# Patient Record
Sex: MALE | Race: WHITE | Employment: OTHER | URBAN - METROPOLITAN AREA
[De-identification: names, ages, dates, MRNs, and addresses within clinical notes are randomized per-mention and may not be internally consistent; named-entity substitution may affect disease eponyms.]

---

## 2018-11-07 ENCOUNTER — RADIANT APPOINTMENT (OUTPATIENT)
Dept: GENERAL RADIOLOGY | Facility: CLINIC | Age: 73
End: 2018-11-07
Attending: FAMILY MEDICINE
Payer: COMMERCIAL

## 2018-11-07 ENCOUNTER — OFFICE VISIT (OUTPATIENT)
Dept: ORTHOPEDICS | Facility: CLINIC | Age: 73
End: 2018-11-07
Payer: COMMERCIAL

## 2018-11-07 VITALS
HEIGHT: 73 IN | BODY MASS INDEX: 30.22 KG/M2 | SYSTOLIC BLOOD PRESSURE: 127 MMHG | WEIGHT: 228 LBS | DIASTOLIC BLOOD PRESSURE: 71 MMHG

## 2018-11-07 DIAGNOSIS — S59.911A INJURY OF RIGHT LOWER ARM, INITIAL ENCOUNTER: ICD-10-CM

## 2018-11-07 DIAGNOSIS — M25.521 RIGHT ELBOW PAIN: ICD-10-CM

## 2018-11-07 DIAGNOSIS — M25.531 RIGHT WRIST PAIN: ICD-10-CM

## 2018-11-07 DIAGNOSIS — M25.531 RIGHT WRIST PAIN: Primary | ICD-10-CM

## 2018-11-07 PROCEDURE — 73090 X-RAY EXAM OF FOREARM: CPT | Mod: RT

## 2018-11-07 PROCEDURE — 29125 APPL SHORT ARM SPLINT STATIC: CPT | Mod: RT | Performed by: FAMILY MEDICINE

## 2018-11-07 PROCEDURE — 99203 OFFICE O/P NEW LOW 30 MIN: CPT | Mod: 25 | Performed by: FAMILY MEDICINE

## 2018-11-07 RX ORDER — CHLORTHALIDONE 25 MG/1
25 TABLET ORAL
COMMUNITY
Start: 2018-10-02 | End: 2019-10-02

## 2018-11-07 RX ORDER — LOSARTAN POTASSIUM 50 MG/1
50 TABLET ORAL DAILY
Refills: 3 | COMMUNITY
Start: 2018-09-02

## 2018-11-07 RX ORDER — WARFARIN SODIUM 2 MG/1
TABLET ORAL
COMMUNITY
Start: 2018-10-30

## 2018-11-07 RX ORDER — TRAMADOL HYDROCHLORIDE 50 MG/1
TABLET ORAL
COMMUNITY

## 2018-11-07 NOTE — LETTER
"    2018         RE: Yoel Hollis  [address] [facility] [address]        Dear Colleague,    Thank you for referring your patient, Yoel Hollis, to the [facility] [place]. Please see a copy of my visit note below.    Yoel Hollis  :  1945  DOS: 2018  MRN: [id number]    Sports Medicine Clinic Visit    PCP: Mookie Sauceda    Yoel Hollis is a 73 year old Right hand dominant male who is seen as an AIC patient presenting with a right forearm injury.  While putting away a canoe when he flipped it up, he felt a snap in his forearm.  Pain from his elbow to his wrist.      Injury: 2 week(s).  Pain located over right forearm, nonradiating.  Additional Features:  Positive: swelling and bruising.  Symptoms are better with Ice.  Symptoms are worse with: any movements.  Other evaluation and/or treatments so far consists of: Nothing.  Recent imaging completed: No recent imaging completed.  Prior History of related problems: denies     Social History: retired     Review of Systems  Musculoskeletal: as above  Remainder of review of systems is negative including constitutional, CV, pulmonary, GI, Skin and Neurologic except as noted in HPI or medical history.    No past medical history on file.  No past surgical history on file.    Objective  /71  Ht 6' 1\" (1.854 m)  Wt 228 lb (103.4 kg)  BMI 30.08 kg/m2    General: healthy, alert and in no distress    HEENT: no scleral icterus or conjunctival erythema   Skin: no suspicious lesions or rash. No jaundice.   CV: regular rhythm by palpation, 2+ distal pulses, no pedal edema    Resp: normal respiratory effort without conversational dyspnea   Psych: normal mood and affect    Gait: non antalgic, appropriate coordination and balance   Neuro: normal light touch sensory exam of the extremities. Motor strength as noted below     Right Wrist and Hand exam    Inspection:       Swelling: generally about dorsal forearm, extensor mass, " dorsal wrist compartments    Tender:       Wrist and finger extensors, radial/dorsal forearm, lateral/distal forearm, very focal over radial head/neck, mild distal radius and radiocarpal joint    Non Tender:       Remainder of the Wrist and Hand right,      anatomic snuffbox right and      TFCC right    ROM:       Decreased active and passive ROM of the wrist and forearm  with flexion, extension and sup[ination and active finger extension    Strength:       Motor function intact    Neurovascular:       2+ radial pulses bilaterally with brisk capillary refill and      normal sensation to light touch in the radial, median and ulnar nerve distributions    Right Elbow exam:    Inspection:       no ecchymosis       Edema as noted above    ROM:       full with flexion, extension, decreased with forearm supination >> pronation.    Strength:       Motor function intact    Tender:       radial head       Soft tissue as noted above    Non-Tender:       remainder of elbow area       medial epicondyle       olecranon       antecubital space    Sensation:       intact throughout hand       intact throughout forearm     Skin:       well perfused       capillary refill less than 2 seconds      Radiology:  Recent Results (from the past 744 hour(s))   XR Forearm RT 2 vw    Narrative    FOREARM RIGHT TWO VIEWS  11/7/2018 3:23 PM     HISTORY:  Right wrist pain      Impression    IMPRESSION: The radius and ulna appear within normal limits. The wrist  is not optimally evaluated with these radiographs. Scaphoid trapezium  trapezoid joint space narrowing is suspected.       Assessment:  1. Right wrist pain    2. Injury of right lower arm, initial encounter    3. Right elbow pain        Plan:  Discussed the assessment with the patient.  Follow up: 1 week  XR images independently visualized and reviewed with patient today in clinic  Cannot completely r/o nondisplaced radial head fracture  Clear extensor/supinator strain  No retraction seen  on bedside US, myotendinous edema present  Immobilize in posterior splint including wrist and sling for comfort for one week  Re-evaluate at that time for clinical progress  He has been using his arm more than he should over the last 2 weeks, suspect will improve significantly with rest for both strain and possible bone injury  Could consider MRI for labile or worsening sx, but hope to avoid  RICE reviewed  Home handouts provided and supportive care reviewed  All questions were answered today  Contact us with additional questions or concerns  Signs and sx of concern reviewed      Bartolo Bender DO, ESTRELLA  Primary Care Sports Medicine  [facility]             Disclaimer: This note consists of symbols derived from keyboarding, dictation and/or voice recognition software. As a result, there may be errors in the script that have gone undetected. Please consider this when interpreting information found in this chart.    Cast/splint application  Date/Time: 11/7/2018 4:00 PM  Performed by: ANNE ESTRADA  Authorized by: BARTOLO BENDER     Consent:     Consent obtained:  Verbal    Consent given by:  Patient    Alternatives discussed:  Alternative treatment  Pre-procedure details:     Sensation:  Normal  Procedure details:     Laterality:  Right    Location:  Elbow    Elbow:  R elbow    Strapping: yes      Splint type:  Posterior slab    Supplies:  Fiberglass  Post-procedure details:     Pain:  Improved    Sensation:  Normal    Patient tolerance of procedure:  Tolerated well, no immediate complications    Patient provided with cast or splint care instructions: Yes    Comments:      Patient was placed in posterior elbow orthoglass splint with slight wrist extension.        Anne Estrada ATC    Again, thank you for allowing me to participate in the care of your patient.        Sincerely,        Bartolo Bender DO

## 2018-11-07 NOTE — PROGRESS NOTES
"Yoel Hollis  :  1945  DOS: 2018  MRN: [id number]    Sports Medicine Clinic Visit    PCP: Mookie Sauceda    Yoel Hollis is a 73 year old Right hand dominant male who is seen as an AIC patient presenting with a right forearm injury.  While putting away a canoe when he flipped it up, he felt a snap in his forearm.  Pain from his elbow to his wrist.      Injury: 2 week(s).  Pain located over right forearm, nonradiating.  Additional Features:  Positive: swelling and bruising.  Symptoms are better with Ice.  Symptoms are worse with: any movements.  Other evaluation and/or treatments so far consists of: Nothing.  Recent imaging completed: No recent imaging completed.  Prior History of related problems: denies     Social History: retired     Review of Systems  Musculoskeletal: as above  Remainder of review of systems is negative including constitutional, CV, pulmonary, GI, Skin and Neurologic except as noted in HPI or medical history.    No past medical history on file.  No past surgical history on file.    Objective  /71  Ht 6' 1\" (1.854 m)  Wt 228 lb (103.4 kg)  BMI 30.08 kg/m2    General: healthy, alert and in no distress    HEENT: no scleral icterus or conjunctival erythema   Skin: no suspicious lesions or rash. No jaundice.   CV: regular rhythm by palpation, 2+ distal pulses, no pedal edema    Resp: normal respiratory effort without conversational dyspnea   Psych: normal mood and affect    Gait: non antalgic, appropriate coordination and balance   Neuro: normal light touch sensory exam of the extremities. Motor strength as noted below     Right Wrist and Hand exam    Inspection:       Swelling: generally about dorsal forearm, extensor mass, dorsal wrist compartments    Tender:       Wrist and finger extensors, radial/dorsal forearm, lateral/distal forearm, very focal over radial head/neck, mild distal radius and radiocarpal joint    Non Tender:       Remainder of the Wrist and Hand " right,      anatomic snuffbox right and      TFCC right    ROM:       Decreased active and passive ROM of the wrist and forearm  with flexion, extension and sup[ination and active finger extension    Strength:       Motor function intact    Neurovascular:       2+ radial pulses bilaterally with brisk capillary refill and      normal sensation to light touch in the radial, median and ulnar nerve distributions    Right Elbow exam:    Inspection:       no ecchymosis       Edema as noted above    ROM:       full with flexion, extension, decreased with forearm supination >> pronation.    Strength:       Motor function intact    Tender:       radial head       Soft tissue as noted above    Non-Tender:       remainder of elbow area       medial epicondyle       olecranon       antecubital space    Sensation:       intact throughout hand       intact throughout forearm     Skin:       well perfused       capillary refill less than 2 seconds      Radiology:  Recent Results (from the past 744 hour(s))   XR Forearm RT 2 vw    Narrative    FOREARM RIGHT TWO VIEWS  11/7/2018 3:23 PM     HISTORY:  Right wrist pain      Impression    IMPRESSION: The radius and ulna appear within normal limits. The wrist  is not optimally evaluated with these radiographs. Scaphoid trapezium  trapezoid joint space narrowing is suspected.       Assessment:  1. Right wrist pain    2. Injury of right lower arm, initial encounter    3. Right elbow pain        Plan:  Discussed the assessment with the patient.  Follow up: 1 week  XR images independently visualized and reviewed with patient today in clinic  Cannot completely r/o nondisplaced radial head fracture  Clear extensor/supinator strain  No retraction seen on bedside US, myotendinous edema present  Immobilize in posterior splint including wrist and sling for comfort for one week  Re-evaluate at that time for clinical progress  He has been using his arm more than he should over the last 2 weeks,  suspect will improve significantly with rest for both strain and possible bone injury  Could consider MRI for labile or worsening sx, but hope to avoid  RICE reviewed  Home handouts provided and supportive care reviewed  All questions were answered today  Contact us with additional questions or concerns  Signs and sx of concern reviewed      Bartolo Donohue DO, ESTRELLA  Primary Care Sports Medicine  [facility]             Disclaimer: This note consists of symbols derived from keyboarding, dictation and/or voice recognition software. As a result, there may be errors in the script that have gone undetected. Please consider this when interpreting information found in this chart.

## 2018-11-07 NOTE — PROGRESS NOTES
Cast/splint application  Date/Time: 11/7/2018 4:00 PM  Performed by: ABDELRAHMAN ESTRADA  Authorized by: COLLETTE BENDER     Consent:     Consent obtained:  Verbal    Consent given by:  Patient    Alternatives discussed:  Alternative treatment  Pre-procedure details:     Sensation:  Normal  Procedure details:     Laterality:  Right    Location:  Elbow    Elbow:  R elbow    Strapping: yes      Splint type:  Posterior slab    Supplies:  Fiberglass  Post-procedure details:     Pain:  Improved    Sensation:  Normal    Patient tolerance of procedure:  Tolerated well, no immediate complications    Patient provided with cast or splint care instructions: Yes    Comments:      Patient was placed in posterior elbow orthoglass splint with slight wrist extension.        Abdelrahman Estrada ATC

## 2018-11-07 NOTE — MR AVS SNAPSHOT
"              After Visit Summary   11/7/2018    Yoel Hollis    MRN: [id number]           Patient Information     Date Of Birth          1945        Visit Information        Provider Department      11/7/2018 2:20 PM Bartolo Donohue DO [facility] [place]        Today's Diagnoses     Right wrist pain    -  1    Injury of right lower arm, initial encounter        Right elbow pain           Follow-ups after your visit        Your next 10 appointments already scheduled     Nov 14, 2018  2:20 PM CST   Return Visit with Bartolo Donohue DO   [facility] [place] ([facility] [place])    [address]  [place] [address] [phone]              Who to contact     If you have questions or need follow up information about today's clinic visit or your schedule please contact [facility] [place] directly at [phone].  Normal or non-critical lab and imaging results will be communicated to you by Dizzionhart, letter or phone within 4 business days after the clinic has received the results. If you do not hear from us within 7 days, please contact the clinic through Dizzionhart or phone. If you have a critical or abnormal lab result, we will notify you by phone as soon as possible.  Submit refill requests through [employer] or call your pharmacy and they will forward the refill request to us. Please allow 3 business days for your refill to be completed.          Additional Information About Your Visit        Dizzionhart Information     [employer] lets you send messages to your doctor, view your test results, renew your prescriptions, schedule appointments and more. To sign up, go to [email or web address] . Click on \"Log in\" on the left side of the screen, which will take you to the Welcome page. Then click on \"Sign up Now\" on the right side of the page.     You will be asked to enter the access code listed below, " "as well as some personal information. Please follow the directions to create your username and password.     Your access code is: [email or web address]  Expires: 2019  4:36 PM     Your access code will  in 90 days. If you need help or a new code, please call your [facility] clinic or [phone].        Care EveryWhere ID     This is your Care EveryWhere ID. This could be used by other organizations to access your [facility] medical records  [id number]        Your Vitals Were     Height BMI (Body Mass Index)                6' 1\" (1.854 m) 30.08 kg/m2           Blood Pressure from Last 3 Encounters:   18 127/71   07 104/64    Weight from Last 3 Encounters:   18 228 lb (103.4 kg)              We Performed the Following     Cast application       Information about OPIOIDS     PRESCRIPTION OPIOIDS: WHAT YOU NEED TO KNOW   We gave you an opioid (narcotic) pain medicine. It is important to manage your pain, but opioids are not always the best choice. You should first try all the other options your care team gave you. Take this medicine for as short a time (and as few doses) as possible.    Some activities can increase your pain, such as bandage changes or therapy sessions. It may help to take your pain medicine 30 to 60 minutes before these activities. Reduce your stress by getting enough sleep, working on hobbies you enjoy and practicing relaxation or meditation. Talk to your care team about ways to manage your pain beyond prescription opioids.    These medicines have risks:    DO NOT drive when on new or higher doses of pain medicine. These medicines can affect your alertness and reaction times, and you could be arrested for driving under the influence (DUI). If you need to use opioids long-term, talk to your care team about driving.    DO NOT operate heavy machinery    DO NOT do any other dangerous activities while taking these medicines.    DO NOT drink any alcohol while taking these medicines.     If " the opioid prescribed includes acetaminophen, DO NOT take with any other medicines that contain acetaminophen. Read all labels carefully. Look for the word  acetaminophen  or  Tylenol.  Ask your pharmacist if you have questions or are unsure.    You can get addicted to pain medicines, especially if you have a history of addiction (chemical, alcohol or substance dependence). Talk to your care team about ways to reduce this risk.    All opioids tend to cause constipation. Drink plenty of water and eat foods that have a lot of fiber, such as fruits, vegetables, prune juice, apple juice and high-fiber cereal. Take a laxative (Miralax, milk of magnesia, Colace, Senna) if you don t move your bowels at least every other day. Other side effects include upset stomach, sleepiness, dizziness, throwing up, tolerance (needing more of the medicine to have the same effect), physical dependence and slowed breathing.    Store your pills in a secure place, locked if possible. We will not replace any lost or stolen medicine. If you don t finish your medicine, please throw away (dispose) as directed by your pharmacist. The [place] Pollution Control Agency has more information about safe disposal: [email or web address]-medications         Primary Care Provider Office Phone # Fax #    Mookie Sauceda [phone]       [facility] [address]  [facility] [address]        Equal Access to Services     RERE DERAS : Hadii aad ku hadasho Sovalarieali, waaxda luqadaha, qaybta kaalmada adeshirinyada, geneva crisostomo . So [facility] [phone].    ATENCIÓN: Si habla español, tiene a koch disposición servicios gratuitos de asistencia lingüística. Llame al [phone].    We comply with applicable federal civil rights laws and [place] laws. We do not discriminate on the basis of race, color, national origin, age, disability, sex, sexual orientation, or  gender identity.            Thank you!     Thank you for choosing [facility]  for your care. Our goal is always to provide you with excellent care. Hearing back from our patients is one way we can continue to improve our services. Please take a few minutes to complete the written survey that you may receive in the mail after your visit with us. Thank you!             Your Updated Medication List - Protect others around you: Learn how to safely use, store and throw away your medicines at www.disposemymeds.org.          This list is accurate as of 11/7/18  4:36 PM.  Always use your most recent med list.                   Brand Name Dispense Instructions for use Diagnosis    aspirin 325 MG EC tablet      1 TABLET EVERY 4 HOURS AS NEEDED        chlorthalidone 25 MG tablet    HYGROTON     Take 25 mg by mouth        COUMADIN 2 MG tablet   Generic drug:  warfarin      4 mg (2 mg x 2) on Sun; 5 mg (2.5 mg x 2) all other days  or as directed by provider        hydrochlorothiazide 25 MG tablet    HYDRODIURIL    35    1 TABLET DAILY        klonoPIN 1 MG tablet   Generic drug:  clonazePAM     0    Take one tablet by mouth once daily        LIDODERM 5 % Patch   Generic drug:  lidocaine     35    Apply 1-3 daily as neede to painful areas        losartan 50 MG tablet    COZAAR     Take 50 mg by mouth daily        MIRAPEX 0.5 MG tablet   Generic drug:  pramipexole     0    1 TABLET 3 TIMES DAILY        Multi-vitamin Tabs tablet   Generic drug:  multivitamin, therapeutic with minerals      1 TABLET DAILY        * traMADol 50 MG tablet    ULTRAM     Take 3 tabs daily        * traMADol 50 MG tablet    ULTRAM    0    1 TABLET EVERY 4 TO 6 HOURS AS NEEDED        VICODIN 5-500 MG per tablet   Generic drug:  HYDROcodone-acetaminophen      1 TABLET EVERY 4 TO 6 HOURS AS NEEDED        * Notice:  This list has 2 medication(s) that are the same as other medications prescribed for you. Read the directions  carefully, and ask your doctor or other care provider to review them with you.

## 2018-11-14 ENCOUNTER — OFFICE VISIT (OUTPATIENT)
Dept: ORTHOPEDICS | Facility: CLINIC | Age: 73
End: 2018-11-14
Payer: COMMERCIAL

## 2018-11-14 VITALS
BODY MASS INDEX: 30.22 KG/M2 | DIASTOLIC BLOOD PRESSURE: 80 MMHG | HEIGHT: 73 IN | SYSTOLIC BLOOD PRESSURE: 161 MMHG | WEIGHT: 228 LBS

## 2018-11-14 DIAGNOSIS — S59.911A INJURY OF RIGHT LOWER ARM, INITIAL ENCOUNTER: ICD-10-CM

## 2018-11-14 DIAGNOSIS — M25.521 RIGHT ELBOW PAIN: ICD-10-CM

## 2018-11-14 DIAGNOSIS — M25.531 RIGHT WRIST PAIN: Primary | ICD-10-CM

## 2018-11-14 PROCEDURE — 99213 OFFICE O/P EST LOW 20 MIN: CPT | Performed by: FAMILY MEDICINE

## 2018-11-14 NOTE — PROGRESS NOTES
"Yoel Hollis  :  1945  DOS: 18  MRN: [id number]    Sports Medicine Clinic Visit    PCP: Mookie Sauceda    Yoel Hollis is a 73 year old Right hand dominant male who is seen as an AIC patient presenting with a right forearm injury.  While putting away a canoe when he flipped it up, he felt a snap in his forearm.  Pain from his elbow to his wrist.      Injury: 2 week(s).  Pain located over right forearm, nonradiating.  Additional Features:  Positive: swelling and bruising.  Symptoms are better with Ice.  Symptoms are worse with: any movements.  Other evaluation and/or treatments so far consists of: Nothing.  Recent imaging completed: No recent imaging completed.  Prior History of related problems: denies     Social History: retired     Interim History 2018  Yoel Hollis is now 3 weeks out from injury.  Since last visit on 2018 patient improving right forearm pain.  Patient reports that has increased AROM at elbow and wrist.  Continues to mild-moderate discomfort with supination/pronation movements.     Review of Systems  Musculoskeletal: as above  Remainder of review of systems is negative including constitutional, CV, pulmonary, GI, Skin and Neurologic except as noted in HPI or medical history.    Past Medical History:   Diagnosis Date     Hypertension      No past surgical history on file.    Objective  /80  Ht 6' 1\" (1.854 m)  Wt 228 lb (103.4 kg)  BMI 30.08 kg/m2    General: healthy, alert and in no distress    HEENT: no scleral icterus or conjunctival erythema   Skin: no suspicious lesions or rash. No jaundice.   CV: regular rhythm by palpation, 2+ distal pulses, no pedal edema    Resp: normal respiratory effort without conversational dyspnea   Psych: normal mood and affect    Gait: non antalgic, appropriate coordination and balance   Neuro: normal light touch sensory exam of the extremities. Motor strength as noted below     Right Wrist and Hand " exam    Inspection:       Swelling: generally about dorsal forearm, extensor mass, dorsal wrist compartments improving    Tender:       Wrist and finger extensors, radial/dorsal forearm, lateral/distal forearm, no longer focal over radial head/neck, moderate and still focal distal radius and radiocarpal joint    Non Tender:       Remainder of the Wrist and Hand right,      anatomic snuffbox right and      TFCC right    ROM:       Decreased active and passive ROM of the wrist and forearm  with flexion, extension and koch[ination, significantly improved active finger extension    Strength:       Motor function intact    Neurovascular:       2+ radial pulses bilaterally with brisk capillary refill and      normal sensation to light touch in the radial, median and ulnar nerve distributions    Right Elbow exam:    Inspection:       no ecchymosis    ROM:       full with flexion, extension, decreased with forearm supination >> pronation but improved    Strength:       Motor function intact    Tender: minimal over radial head    Non-Tender:       remainder of elbow area       medial epicondyle       olecranon       antecubital space    Sensation:       intact throughout hand       intact throughout forearm     Skin:       well perfused       capillary refill less than 2 seconds      Radiology:  Recent Results (from the past 744 hour(s))   XR Forearm RT 2 vw    Narrative    FOREARM RIGHT TWO VIEWS  11/7/2018 3:23 PM     HISTORY:  Right wrist pain      Impression    IMPRESSION: The radius and ulna appear within normal limits. The wrist  is not optimally evaluated with these radiographs. Scaphoid trapezium  trapezoid joint space narrowing is suspected.       Assessment:  1. Right wrist pain    2. Injury of right lower arm, initial encounter    3. Right elbow pain        Plan:  Discussed the assessment with the patient.  Follow up: 2-3 weeks  XR images independently visualized and reviewed with patient today again in  clinic  Cannot completely r/o nondisplaced radial head fracture, though significant clinical improvement with this  Clear extensor/supinator strain  No retraction seen on bedside US, myotendinous edema present  Discontinue posterior splint, sling for comfort only, not likely to be needed at this point  Ongoing soft tissue strain sx in forearm, though improved  Most focal pain today over distal radius, cannot r/o occult fracture there  Has been protected in splint, and switched to wrist brace today, use full time for the next 2 weeks  The brace was comfortable, and limited all of his painful motions  Could consider MRI for labile or worsening sx, but hope to avoid  RICE reviewed  Supportive care reviewed  All questions were answered today  Contact us with additional questions or concerns  Signs and sx of concern reviewed      Bartolo Donohue DO, ESTRELLA  Primary Care Sports Medicine  [facility]             Disclaimer: This note consists of symbols derived from keyboarding, dictation and/or voice recognition software. As a result, there may be errors in the script that have gone undetected. Please consider this when interpreting information found in this chart.

## 2018-11-14 NOTE — LETTER
"    2018         RE: Yoel Hollis  [address] [facility] [address]        Dear Colleague,    Thank you for referring your patient, Yoel Hollis, to the [facility]. Please see a copy of my visit note below.    Yoel Hollis  :  1945  DOS: 18  MRN: [id number]    Sports Medicine Clinic Visit    PCP: Mookie Sauceda    Yoel Hollis is a 73 year old Right hand dominant male who is seen as an AIC patient presenting with a right forearm injury.  While putting away a canoe when he flipped it up, he felt a snap in his forearm.  Pain from his elbow to his wrist.      Injury: 2 week(s).  Pain located over right forearm, nonradiating.  Additional Features:  Positive: swelling and bruising.  Symptoms are better with Ice.  Symptoms are worse with: any movements.  Other evaluation and/or treatments so far consists of: Nothing.  Recent imaging completed: No recent imaging completed.  Prior History of related problems: denies     Social History: retired     Interim History 2018  Yoel Hollis is now 3 weeks out from injury.  Since last visit on 2018 patient improving right forearm pain.  Patient reports that has increased AROM at elbow and wrist.  Continues to mild-moderate discomfort with supination/pronation movements.     Review of Systems  Musculoskeletal: as above  Remainder of review of systems is negative including constitutional, CV, pulmonary, GI, Skin and Neurologic except as noted in HPI or medical history.    Past Medical History:   Diagnosis Date     Hypertension      No past surgical history on file.    Objective  /80  Ht 6' 1\" (1.854 m)  Wt 228 lb (103.4 kg)  BMI 30.08 kg/m2    General: healthy, alert and in no distress    HEENT: no scleral icterus or conjunctival erythema   Skin: no suspicious lesions or rash. No jaundice.   CV: regular rhythm by palpation, 2+ distal pulses, no pedal edema    Resp: normal respiratory effort " without conversational dyspnea   Psych: normal mood and affect    Gait: non antalgic, appropriate coordination and balance   Neuro: normal light touch sensory exam of the extremities. Motor strength as noted below     Right Wrist and Hand exam    Inspection:       Swelling: generally about dorsal forearm, extensor mass, dorsal wrist compartments improving    Tender:       Wrist and finger extensors, radial/dorsal forearm, lateral/distal forearm, no longer focal over radial head/neck, moderate and still focal distal radius and radiocarpal joint    Non Tender:       Remainder of the Wrist and Hand right,      anatomic snuffbox right and      TFCC right    ROM:       Decreased active and passive ROM of the wrist and forearm  with flexion, extension and koch[ination, significantly improved active finger extension    Strength:       Motor function intact    Neurovascular:       2+ radial pulses bilaterally with brisk capillary refill and      normal sensation to light touch in the radial, median and ulnar nerve distributions    Right Elbow exam:    Inspection:       no ecchymosis    ROM:       full with flexion, extension, decreased with forearm supination >> pronation but improved    Strength:       Motor function intact    Tender: minimal over radial head    Non-Tender:       remainder of elbow area       medial epicondyle       olecranon       antecubital space    Sensation:       intact throughout hand       intact throughout forearm     Skin:       well perfused       capillary refill less than 2 seconds      Radiology:  Recent Results (from the past 744 hour(s))   XR Forearm RT 2 vw    Narrative    FOREARM RIGHT TWO VIEWS  11/7/2018 3:23 PM     HISTORY:  Right wrist pain      Impression    IMPRESSION: The radius and ulna appear within normal limits. The wrist  is not optimally evaluated with these radiographs. Scaphoid trapezium  trapezoid joint space narrowing is suspected.       Assessment:  1. Right wrist pain     2. Injury of right lower arm, initial encounter    3. Right elbow pain        Plan:  Discussed the assessment with the patient.  Follow up: 2-3 weeks  XR images independently visualized and reviewed with patient today again in clinic  Cannot completely r/o nondisplaced radial head fracture, though significant clinical improvement with this  Clear extensor/supinator strain  No retraction seen on bedside US, myotendinous edema present  Discontinue posterior splint, sling for comfort only, not likely to be needed at this point  Ongoing soft tissue strain sx in forearm, though improved  Most focal pain today over distal radius, cannot r/o occult fracture there  Has been protected in splint, and switched to wrist brace today, use full time for the next 2 weeks  The brace was comfortable, and limited all of his painful motions  Could consider MRI for labile or worsening sx, but hope to avoid  RICE reviewed  Supportive care reviewed  All questions were answered today  Contact us with additional questions or concerns  Signs and sx of concern reviewed      Bartolo Donohue DO, CAQ  Primary Care Sports Medicine  [facility]             Disclaimer: This note consists of symbols derived from keyboarding, dictation and/or voice recognition software. As a result, there may be errors in the script that have gone undetected. Please consider this when interpreting information found in this chart.    Again, thank you for allowing me to participate in the care of your patient.        Sincerely,        Bartolo Donohue DO

## 2018-11-14 NOTE — MR AVS SNAPSHOT
After Visit Summary   11/14/2018    Yoel Hollis    MRN: [id number]           Patient Information     Date Of Birth          1945        Visit Information        Provider Department      11/14/2018 2:20 PM Bartolo Donohue DO [facility]        Today's Diagnoses     Right wrist pain    -  1    Injury of right lower arm, initial encounter        Right elbow pain          Care Instructions    Patient to follow up with Primary Care provider regarding elevated blood pressure.          Follow-ups after your visit        Who to contact     If you have questions or need follow up information about today's clinic visit or your schedule please contact [facility] directly at [phone].  Normal or non-critical lab and imaging results will be communicated to you by [employer], letter or phone within 4 business days after the clinic has received the results. If you do not hear from us within 7 days, please contact the clinic through [employer] or phone. If you have a critical or abnormal lab result, we will notify you by phone as soon as possible.  Submit refill requests through [employer] or call your pharmacy and they will forward the refill request to us. Please allow 3 business days for your refill to be completed.          Additional Information About Your Visit        MyChart Information     [employer] gives you secure access to your electronic health record. If you see a primary care provider, you can also send messages to your care team and make appointments. If you have questions, please call your primary care clinic.  If you do not have a primary care provider, please call [phone] and they will assist you.        Care EveryWhere ID     This is your Care EveryWhere ID. This could be used by other organizations to access your [facility] medical records  [id number]        Your Vitals Were     Height BMI (Body Mass Index)                 "6' 1\" (1.854 m) 30.08 kg/m2           Blood Pressure from Last 3 Encounters:   11/14/18 161/80   11/07/18 127/71   12/17/07 104/64    Weight from Last 3 Encounters:   11/14/18 228 lb (103.4 kg)   11/07/18 228 lb (103.4 kg)              Today, you had the following     No orders found for display       Primary Care Provider Office Phone # Fax #    Mookie Sauceda [phone]       [facility] [address]  [facility] [address]        Equal Access to Services     [facility]: Hadii aad ku hadasho Sovalarieali, waaxda luqadaha, qaybta kaalmada adeegyada, waxanna brunoin haydeandren david crisostomo . So [facility] [phone].    ATENCIÓN: Si habla español, tiene a koch disposición servicios gratuitos de asistencia lingüística. [facility] [phone].    We comply with applicable federal civil rights laws and [place] laws. We do not discriminate on the basis of race, color, national origin, age, disability, sex, sexual orientation, or gender identity.            Thank you!     Thank you for choosing [facility] [place]  for your care. Our goal is always to provide you with excellent care. Hearing back from our patients is one way we can continue to improve our services. Please take a few minutes to complete the written survey that you may receive in the mail after your visit with us. Thank you!             Your Updated Medication List - Protect others around you: Learn how to safely use, store and throw away your medicines at www.disposemymeds.org.          This list is accurate as of 11/14/18 11:59 PM.  Always use your most recent med list.                   Brand Name Dispense Instructions for use Diagnosis    aspirin 325 MG EC tablet      1 TABLET EVERY 4 HOURS AS NEEDED        chlorthalidone 25 MG tablet    HYGROTON     Take 25 mg by mouth        COUMADIN 2 MG tablet   Generic drug:  warfarin      4 mg (2 mg x 2) on Sun; 5 mg (2.5 mg x 2) all other days  or as " directed by provider        hydrochlorothiazide 25 MG tablet    HYDRODIURIL    35    1 TABLET DAILY        klonoPIN 1 MG tablet   Generic drug:  clonazePAM     0    Take one tablet by mouth once daily        LIDODERM 5 % Patch   Generic drug:  lidocaine     35    Apply 1-3 daily as neede to painful areas        losartan 50 MG tablet    COZAAR     Take 50 mg by mouth daily        MIRAPEX 0.5 MG tablet   Generic drug:  pramipexole     0    1 TABLET 3 TIMES DAILY        Multi-vitamin Tabs tablet   Generic drug:  multivitamin, therapeutic with minerals      1 TABLET DAILY        * traMADol 50 MG tablet    ULTRAM     Take 3 tabs daily        * traMADol 50 MG tablet    ULTRAM    0    1 TABLET EVERY 4 TO 6 HOURS AS NEEDED        VICODIN 5-500 MG per tablet   Generic drug:  HYDROcodone-acetaminophen      1 TABLET EVERY 4 TO 6 HOURS AS NEEDED        * Notice:  This list has 2 medication(s) that are the same as other medications prescribed for you. Read the directions carefully, and ask your doctor or other care provider to review them with you.

## 2018-12-05 ENCOUNTER — OFFICE VISIT (OUTPATIENT)
Dept: ORTHOPEDICS | Facility: CLINIC | Age: 73
End: 2018-12-05
Payer: COMMERCIAL

## 2018-12-05 VITALS
DIASTOLIC BLOOD PRESSURE: 77 MMHG | WEIGHT: 229 LBS | BODY MASS INDEX: 30.35 KG/M2 | HEIGHT: 73 IN | SYSTOLIC BLOOD PRESSURE: 135 MMHG

## 2018-12-05 DIAGNOSIS — M25.531 RIGHT WRIST PAIN: Primary | ICD-10-CM

## 2018-12-05 DIAGNOSIS — S59.911D: ICD-10-CM

## 2018-12-05 PROCEDURE — 99213 OFFICE O/P EST LOW 20 MIN: CPT | Performed by: FAMILY MEDICINE

## 2018-12-05 NOTE — LETTER
2018         RE: Yoel Hollis  [address] [facility] [address]        Dear Colleague,    Thank you for referring your patient, Yoel Hollis, to the [facility]. Please see a copy of my visit note below.    Yoel Hollis  :  1945  DOS: 18  MRN: [id number]    Sports Medicine Clinic Visit    PCP: Mookie Sauceda    Yoel Hollis is a 73 year old Right hand dominant male who is seen as an AIC patient presenting with a right forearm injury.  While putting away a canoe when he flipped it up, he felt a snap in his forearm.  Pain from his elbow to his wrist.      Injury: 2 week(s).  Pain located over right forearm, nonradiating.  Additional Features:  Positive: swelling and bruising.  Symptoms are better with Ice.  Symptoms are worse with: any movements.  Other evaluation and/or treatments so far consists of: Nothing.  Recent imaging completed: No recent imaging completed.  Prior History of related problems: denies     Social History: retired     Interim History 2018  Yoel Hollis is now 3 weeks out from injury.  Since last visit on 2018 patient improving right forearm pain.  Patient reports that has increased AROM at elbow and wrist.  Continues to mild-moderate discomfort with supination/pronation movements.     Interim History - 2018  Since last visit on 2018 patient has improving right wrist pain.  Has discontinued using brace over last 3 days with mild-moderate discomfort.  Used snowblower on  with moderate discomfort with prolonged gripping.  Stiffness noted with supination/pronation.  No interim injury.       Review of Systems  Musculoskeletal: as above  Remainder of review of systems is negative including constitutional, CV, pulmonary, GI, Skin and Neurologic except as noted in HPI or medical history.    Past Medical History:   Diagnosis Date     Hypertension      No past surgical history on  "file.    Objective  /77   Ht 1.854 m (6' 1\")   Wt 103.9 kg (229 lb)   BMI 30.21 kg/m       General: healthy, alert and in no distress    HEENT: no scleral icterus or conjunctival erythema   Skin: no suspicious lesions or rash. No jaundice.   CV: regular rhythm by palpation, 2+ distal pulses, no pedal edema    Resp: normal respiratory effort without conversational dyspnea   Psych: normal mood and affect    Gait: non antalgic, appropriate coordination and balance   Neuro: normal light touch sensory exam of the extremities. Motor strength as noted below     Right Wrist and Hand exam    Inspection:       Swelling: generally about dorsal forearm, extensor mass, dorsal wrist compartments improved    Tender:       Wrist and finger extensors, radial/dorsal forearm, lateral/distal forearm, no longer focal over radial head/neck, moderate and still focal distal radius and radiocarpal joint, scaphoid    Non Tender:       Remainder of the Wrist and Hand right,      anatomic snuffbox right and      TFCC right, improving but still present    ROM:       Decreased active and passive ROM of the wrist and forearm  with flexion, extension and koch[ination, significantly improved active finger extension    Strength:       Motor function intact    Neurovascular:       2+ radial pulses bilaterally with brisk capillary refill and      normal sensation to light touch in the radial, median and ulnar nerve distributions    Right Elbow exam:    Inspection:       no ecchymosis    ROM:       full with flexion, extension, decreased with forearm supination >> pronation but improved    Strength:       Motor function intact    Tender: minimal over radial head    Non-Tender:       remainder of elbow area       medial epicondyle       olecranon       antecubital space    Sensation:       intact throughout hand       intact throughout forearm     Skin:       well perfused       capillary refill less than 2 seconds      Radiology:  Recent Results " (from the past 744 hour(s))   XR Forearm RT 2 vw    Narrative    FOREARM RIGHT TWO VIEWS  11/7/2018 3:23 PM     HISTORY:  Right wrist pain      Impression    IMPRESSION: The radius and ulna appear within normal limits. The wrist  is not optimally evaluated with these radiographs. Scaphoid trapezium  trapezoid joint space narrowing is suspected.       Assessment:  1. Right wrist pain    2. Injury of right lower arm, subsequent encounter        Plan:  Discussed the assessment with the patient.  Follow up: 2-3 weeks  XR images independently visualized and reviewed with patient today again in clinic  Cannot completely r/o nondisplaced radial head or scaphoid fracture, though significant clinical improvement with this  Clear extensor/supinator strain  Thumb spica splint is more comfortable and will be used in short term  Will communicate via [employer] or f/u with progress  Overall continues to imporve with time  Could consider MRI for labile or worsening sx, but hope to avoid  RICE reviewed  Supportive care reviewed  All questions were answered today  Contact us with additional questions or concerns  Signs and sx of concern reviewed      Bartolo Donohue DO, ESTRELLA  Primary Care Sports Medicine  [facility]             Disclaimer: This note consists of symbols derived from keyboarding, dictation and/or voice recognition software. As a result, there may be errors in the script that have gone undetected. Please consider this when interpreting information found in this chart.    Again, thank you for allowing me to participate in the care of your patient.        Sincerely,        Bartolo Donohue DO

## 2018-12-06 NOTE — PROGRESS NOTES
"Yoel Hollis  :  1945  DOS: 18  MRN: [id number]    Sports Medicine Clinic Visit    PCP: Mookie Sauceda    Yoel Hollis is a 73 year old Right hand dominant male who is seen as an AIC patient presenting with a right forearm injury.  While putting away a canoe when he flipped it up, he felt a snap in his forearm.  Pain from his elbow to his wrist.      Injury: 2 week(s).  Pain located over right forearm, nonradiating.  Additional Features:  Positive: swelling and bruising.  Symptoms are better with Ice.  Symptoms are worse with: any movements.  Other evaluation and/or treatments so far consists of: Nothing.  Recent imaging completed: No recent imaging completed.  Prior History of related problems: denies     Social History: retired     Interim History 2018  Yoel Hollis is now 3 weeks out from injury.  Since last visit on 2018 patient improving right forearm pain.  Patient reports that has increased AROM at elbow and wrist.  Continues to mild-moderate discomfort with supination/pronation movements.     Interim History - 2018  Since last visit on 2018 patient has improving right wrist pain.  Has discontinued using brace over last 3 days with mild-moderate discomfort.  Used snowblower on  with moderate discomfort with prolonged gripping.  Stiffness noted with supination/pronation.  No interim injury.       Review of Systems  Musculoskeletal: as above  Remainder of review of systems is negative including constitutional, CV, pulmonary, GI, Skin and Neurologic except as noted in HPI or medical history.    Past Medical History:   Diagnosis Date     Hypertension      No past surgical history on file.    Objective  /77   Ht 1.854 m (6' 1\")   Wt 103.9 kg (229 lb)   BMI 30.21 kg/m      General: healthy, alert and in no distress    HEENT: no scleral icterus or conjunctival erythema   Skin: no suspicious lesions or rash. No jaundice.   CV: regular rhythm by " palpation, 2+ distal pulses, no pedal edema    Resp: normal respiratory effort without conversational dyspnea   Psych: normal mood and affect    Gait: non antalgic, appropriate coordination and balance   Neuro: normal light touch sensory exam of the extremities. Motor strength as noted below     Right Wrist and Hand exam    Inspection:       Swelling: generally about dorsal forearm, extensor mass, dorsal wrist compartments improved    Tender:       Wrist and finger extensors, radial/dorsal forearm, lateral/distal forearm, no longer focal over radial head/neck, moderate and still focal distal radius and radiocarpal joint, scaphoid    Non Tender:       Remainder of the Wrist and Hand right,      anatomic snuffbox right and      TFCC right, improving but still present    ROM:       Decreased active and passive ROM of the wrist and forearm  with flexion, extension and koch[ination, significantly improved active finger extension    Strength:       Motor function intact    Neurovascular:       2+ radial pulses bilaterally with brisk capillary refill and      normal sensation to light touch in the radial, median and ulnar nerve distributions    Right Elbow exam:    Inspection:       no ecchymosis    ROM:       full with flexion, extension, decreased with forearm supination >> pronation but improved    Strength:       Motor function intact    Tender: minimal over radial head    Non-Tender:       remainder of elbow area       medial epicondyle       olecranon       antecubital space    Sensation:       intact throughout hand       intact throughout forearm     Skin:       well perfused       capillary refill less than 2 seconds      Radiology:  Recent Results (from the past 744 hour(s))   XR Forearm RT 2 vw    Narrative    FOREARM RIGHT TWO VIEWS  11/7/2018 3:23 PM     HISTORY:  Right wrist pain      Impression    IMPRESSION: The radius and ulna appear within normal limits. The wrist  is not optimally evaluated with these  radiographs. Scaphoid trapezium  trapezoid joint space narrowing is suspected.       Assessment:  1. Right wrist pain    2. Injury of right lower arm, subsequent encounter        Plan:  Discussed the assessment with the patient.  Follow up: 2-3 weeks  XR images independently visualized and reviewed with patient today again in clinic  Cannot completely r/o nondisplaced radial head or scaphoid fracture, though significant clinical improvement with this  Clear extensor/supinator strain  Thumb spica splint is more comfortable and will be used in short term  Will communicate via Trul[employer] or f/u with progress  Overall continues to imporve with time  Could consider MRI for labile or worsening sx, but hope to avoid  RICE reviewed  Supportive care reviewed  All questions were answered today  Contact us with additional questions or concerns  Signs and sx of concern reviewed      Bartolo Donohue DO, ESTRELLA  Primary Care Sports Medicine  [facility]             Disclaimer: This note consists of symbols derived from keyboarding, dictation and/or voice recognition software. As a result, there may be errors in the script that have gone undetected. Please consider this when interpreting information found in this chart.

## 2019-12-08 ENCOUNTER — HEALTH MAINTENANCE LETTER (OUTPATIENT)
Age: 74
End: 2019-12-08

## 2020-03-15 ENCOUNTER — HEALTH MAINTENANCE LETTER (OUTPATIENT)
Age: 75
End: 2020-03-15